# Patient Record
Sex: FEMALE | Race: WHITE
[De-identification: names, ages, dates, MRNs, and addresses within clinical notes are randomized per-mention and may not be internally consistent; named-entity substitution may affect disease eponyms.]

---

## 2018-03-12 ENCOUNTER — HOSPITAL ENCOUNTER (OUTPATIENT)
Dept: HOSPITAL 95 - LAB EV | Age: 33
Discharge: HOME | End: 2018-03-12
Payer: COMMERCIAL

## 2018-03-12 DIAGNOSIS — J06.9: Primary | ICD-10-CM

## 2019-01-24 ENCOUNTER — HOSPITAL ENCOUNTER (OUTPATIENT)
Dept: HOSPITAL 95 - LAB | Age: 34
Discharge: HOME | End: 2019-01-24
Attending: NURSE PRACTITIONER
Payer: COMMERCIAL

## 2019-01-24 DIAGNOSIS — Z32.01: Primary | ICD-10-CM

## 2019-10-08 NOTE — NUR
LACTATION CONSULT. HE IS LESS THAN 24 HOURS OLD, EDEMATOUS HEAD FROM DELIVERY.
COORDINATED HIS SUCK ON MY FINGER, TENDS TO BITE INITIALLY AND KEEP TONGUE
TOWARDS REAR OF MOUTH. DOES INCREASE HIS SUCTION AS HE EXTENDS HIS TONGUE
FURTHER FORWARD. INSTRUCT/DEMO POSITIONING TO HELP OBTAIN A DEEPER ASYMETRIC
LATCH AND THEN FURTHER WIDEN THE LATCH IF NEEDED FOR COMFORT. MOM ABLE TO SELF
EXPRESS SMALL DROPS OF COLOSTRUM. INSTRUCT IN CHANGES TO EXPECT DURING THE
FIRST WEEK WITH BABY AND WITH FEEDINGS AND REFERRED TO BF BROCHURE AND BF
BOOKLET PAGE 18 FOR PHOTOS AND INFORMATION. QUESTIONS ANSWERED. BOTH PARENTS
ATTENTIVE TO TEACHING.

## 2019-10-09 NOTE — NUR
pt scripts were in the chart, have script for motrin and newmans ointment for
pt will put with kardex for pt to come back and get

## 2019-10-09 NOTE — NUR
10/09/19 0430
pt awake sittting in chair and Dad in rocking chair holding baby. pt states
that baby had fed q1-1.5 hours and seems fussy unless he is being held and
rocked. tolerating feeds well, having multiple voids and stools. head
unchanged. baby seems pacifies in dads arms with pacifier at this time, mom
given gel pads for nipple discomfort, declines pain meds at this time

## 2019-10-09 NOTE — NUR
pt tearful, crying a little. worried about the breastfeeding and gave the baby
some formula this morning and was crying because that wasnt part of her plan.
explained colstrum, when breastmilk comes in, explained ok to supplement only
if she wants to but not needed, she was worried about 6% wt loss, explained wt
loss is expected and it is not greater than 10%, pt verbalized understanding,
showed breastfeeding booklet and new beginning booklet

## 2023-06-08 NOTE — NUR
Ambulatory in Day Surgery
History, Chart, Medications and Allergies reviewed before start of
procedure.
Pre-Op teaching done. Pt verbalizes understanding.
Patient States Post-Procedure ride home has been arranged with husbandmichael Perez.

## 2023-06-08 NOTE — NUR
Large Joint Aspiration/Injection  Date/Time: 9/27/2017 3:54 PM  Performed by: TG OLGUIN  Authorized by: TG OLGUIN     Consent Done?:  Yes (Verbal)  Indications:  Pain  Timeout: Prior to procedure the correct patient, procedure, and site was verified      Location:  Shoulder  Site:  L subacromial bursa  Prep: Patient was prepped and draped in usual sterile fashion    Ultrasonic Guidance for needle placement: No  Needle size:  25 G  Approach:  Posterior  Medications:  40 mg triamcinolone acetonide 40 mg/mL  Patient tolerance:  Patient tolerated the procedure well with no immediate complications       Patient up to Ambulate independently. Gait steady.
Discharge instructions reviewed with patient. Patient verbalizes understanding.
Copy given to patient to take home.
Patient States Post-Procedure ride home has been arranged.
Discharged via wheelchair to private car for ride home.